# Patient Record
Sex: MALE | Race: OTHER
[De-identification: names, ages, dates, MRNs, and addresses within clinical notes are randomized per-mention and may not be internally consistent; named-entity substitution may affect disease eponyms.]

---

## 2019-04-07 ENCOUNTER — HOSPITAL ENCOUNTER (EMERGENCY)
Dept: HOSPITAL 47 - EC | Age: 24
Discharge: HOME | End: 2019-04-07
Payer: COMMERCIAL

## 2019-04-07 VITALS — DIASTOLIC BLOOD PRESSURE: 97 MMHG | RESPIRATION RATE: 16 BRPM | HEART RATE: 92 BPM | SYSTOLIC BLOOD PRESSURE: 130 MMHG

## 2019-04-07 VITALS — TEMPERATURE: 98.1 F
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
## 2019-04-07 DIAGNOSIS — S61.411A: Primary | ICD-10-CM

## 2019-04-07 DIAGNOSIS — Z23: ICD-10-CM
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
## 2019-04-07 PROCEDURE — 90715 TDAP VACCINE 7 YRS/> IM: CPT

## 2019-04-07 PROCEDURE — 12002 RPR S/N/AX/GEN/TRNK2.6-7.5CM: CPT

## 2019-04-07 PROCEDURE — 90471 IMMUNIZATION ADMIN: CPT

## 2019-04-07 PROCEDURE — 99283 EMERGENCY DEPT VISIT LOW MDM: CPT

## 2019-04-07 NOTE — ED
Medical Decision Making





- Medical Decision Making





pt tetanus updated





Disposition


Clinical Impression: 


 Laceration





Disposition: HOME SELF-CARE


Condition: Stable


Instructions (If sedation given, give patient instructions):  Laceration (ED)


Additional Instructions: 


Patient to adhere to previously discussed treatment plan and will take 

medication(s) as directed. Patient to follow up with PCP in 1-2 days. Patient to

return to ED if symptoms do not improve. 





Please take Medication as prescribed.  Please follow up with primary care 

provider in 1-2 days.  Please return to ER if condition worsens in any way.





Please return for suture removal: 





Hand: 7-10 days


Face: 5 days


Chest/abdomen: 12-14 days


Extremities: 7-10 days


Scalp: 7 days 


Eyebrow: 5-7 days


Foot/sole: 12-14 days 





Please monitor for signs and symptoms of infection including: redness, warmth, 

drainage, discharge. 





Please return to ED if these signs or symptoms occur, new signs or symptoms 

develop or if condition worsens in anyway. 


Prescriptions: 


Cephalexin [Keflex] 500 mg PO Q6HR 3 Days #12 cap


Is patient prescribed a controlled substance at d/c from ED?: No


Referrals: 


None,Stated [Primary Care Provider] - 1-2 days


[facility] - 1-2 days

## 2019-04-07 NOTE — ED
General Adult HPI





- General


Chief complaint: Wound/Laceration


Stated complaint: Hand laceration


Time Seen by Provider: 04/07/19 18:19


Source: patient, RN notes reviewed, old records reviewed


Mode of arrival: ambulatory


Limitations: no limitations





- History of Present Illness


Initial comments: 


23-year-old male patient with no pertinent past medical history presents to ED 

with superficial laceration to palm of right hand.  Patient reports that he was 

painting a car today, when he was finished painting he is running his hand along

the exterior of the car and cut his hand on the sharp edge.  Patient suffered a 

3 cm superficial laceration to the palm of his right hand.  Patient denies any 

other complaints.  Patient does not know date of last tetanus. 





Systemic: Pt denies fatigue, myalgia, fever/chills, rash. Pt denies weakness, 

night sweats, weight loss. 


Neuro: Pt denies headache, visual disturbances, syncope or pre-syncope.


HEENT: Pt denies ocular discharge or irritation, otalgia, rhinorrhea, 

pharyngitis or notable lymphadenopathy. 


Cardiopulmonary: Pt denies chest pain, SOB, heart palpitations, dyspnea on 

exertion.  


Abdominal/GI: Pt denies abdominal pain, n/v/d. 


: Pt denies dysuria, burning w/ urination, frequency/urgency. Denies new onset

urinary or bowel incontinence.  


MSK: Pt denies myalgia, loss of strength or function in extremities. 


Neuro: Pt denies new onset weakness, paresthesias. 








- Related Data


                                  Previous Rx's











 Medication  Instructions  Recorded


 


Cephalexin [Keflex] 500 mg PO Q6HR 3 Days #12 cap 04/07/19











                                    Allergies











Allergy/AdvReac Type Severity Reaction Status Date / Time


 


No Known Allergies Allergy   Verified 04/07/19 18:19














Review of Systems


ROS Statement: 


Those systems with pertinent positive or pertinent negative responses have been 

documented in the HPI.





ROS Other: All systems not noted in ROS Statement are negative.





Past Medical History


Past Medical History: No Reported History


History of Any Multi-Drug Resistant Organisms: None Reported


Past Surgical History: No Surgical Hx Reported


Past Psychological History: No Psychological Hx Reported


Smoking Status: Never smoker


Past Alcohol Use History: Occasional


Past Drug Use History: None Reported





General Exam





- General Exam Comments


Initial Comments: 





Constitutional: NAD, AOX3, Pt has pleasant affect. 


HEENT: NC/AT, trachea midline, neck supple, no lymphadenopathy. Posterior 

pharynx non erythematous, without exudates. External ears appear normal, without

discharge. Mucous membranes moist. Eyes PERRLA, EOM intact. There is no scleral 

icterus. No pallor noted. 


Cardiopulmonary: RRR, no murmurs, rubs or gallops, no JVD noted. Lungs CTAB in 

anterior and posterior fields. No peripheral edema. 


Abdominal exam: Abdomen soft and non-distended. Abdomen non-tender to palpation 

in all 4 quadrants. Bowel sounds active in LLQ. No hepatosplenomegaly. No 

ecchymosis


Neuro: CN II-XII grossly intact. No nuchal rigidity. 


MSK: 3 cm Superficail laceration to palm of right hand.  Full active range of 

motion hand.  No foreign body, osseous or tendinous involvement.  Wound was 

irrigated with 1 L normal saline.  Wound repaired with 4 simple interrupted 

sutures.  No posterior calf tenderness bilaterally, homans sign negative 

bilaterally. Posterior tibialis and radial pulse +2 bilaterally. Sensation 

intact in upper and lower extremities. Full active ROM in upper and lower 

extremities, 5/5 stregnth. 








Limitations: no limitations





Course





                                   Vital Signs











  04/07/19





  18:16


 


Temperature 98.1 F


 


Pulse Rate 113 H


 


Respiratory 18





Rate 


 


Blood Pressure 156/84


 


O2 Sat by Pulse 97





Oximetry 














Medical Decision Making





- Medical Decision Making





23-year-old male patient with no pertinent past medical history presents to ED 

with superficial laceration to palm of right hand.  Patient reports that he was 

painting a car today, when he was finished painting he is running his hand along

the exterior of the car and cut his hand on the sharp edge.  Patient suffered a 

3 cm superficial laceration to the palm of his right hand.  Patient denies any 

other complaints.  Patient does not know date of last tetanus.  Physical exam 

displayed: 3 cm Superficail laceration to palm of right hand.  Full active range

of motion hand.  No foreign body, osseous or tendinous involvement.  Wound was 

irrigated with 1 L normal saline.  Wound repaired with 4 simple interrupted 

sutures.  Patient to be discharged with 3 days of prophylactic Keflex.  Patient 

to follow up with the primary care provider within 2 days for continued 

evaluation.  Patient educated about signs of infection, was understanding.  

Patient will return in 7-10 days for suture removal.  Patient return if 

condition worsens in any way.  Case discussed with Dr. Helmrich. 











Disposition


Clinical Impression: 


 Laceration





Disposition: HOME SELF-CARE


Condition: Stable


Instructions (If sedation given, give patient instructions):  Laceration (ED)


Additional Instructions: 


Patient to adhere to previously discussed treatment plan and will take 

medication(s) as directed. Patient to follow up with PCP in 1-2 days. Patient to

return to ED if symptoms do not improve. 





Please take Medication as prescribed.  Please follow up with primary care 

provider in 1-2 days.  Please return to ER if condition worsens in any way.





Please return for suture removal: 





Hand: 7-10 days


Face: 5 days


Chest/abdomen: 12-14 days


Extremities: 7-10 days


Scalp: 7 days 


Eyebrow: 5-7 days


Foot/sole: 12-14 days 





Please monitor for signs and symptoms of infection including: redness, warmth, 

drainage, discharge. 





Please return to ED if these signs or symptoms occur, new signs or symptoms dev

elop or if condition worsens in anyway. 


Prescriptions: 


Cephalexin [Keflex] 500 mg PO Q6HR 3 Days #12 cap


Is patient prescribed a controlled substance at d/c from ED?: No


Referrals: 


None,Stated [Primary Care Provider] - 1-2 days


[facility]